# Patient Record
Sex: FEMALE | Race: WHITE
[De-identification: names, ages, dates, MRNs, and addresses within clinical notes are randomized per-mention and may not be internally consistent; named-entity substitution may affect disease eponyms.]

---

## 2020-12-09 ENCOUNTER — DASHBOARD ENCOUNTERS (OUTPATIENT)
Age: 55
End: 2020-12-09

## 2020-12-09 ENCOUNTER — OFFICE VISIT (OUTPATIENT)
Dept: URBAN - METROPOLITAN AREA CLINIC 78 | Facility: CLINIC | Age: 55
End: 2020-12-09
Payer: COMMERCIAL

## 2020-12-09 DIAGNOSIS — K57.50 DIVERTICULOSIS OF BOTH SMALL AND LARGE INTESTINE: ICD-10-CM

## 2020-12-09 DIAGNOSIS — Z90.710 HISTORY OF ABDOMINAL HYSTERECTOMY: ICD-10-CM

## 2020-12-09 DIAGNOSIS — R10.32 LEFT LOWER QUADRANT PAIN: ICD-10-CM

## 2020-12-09 DIAGNOSIS — Z87.19 HISTORY OF ABDOMINAL HERNIA: ICD-10-CM

## 2020-12-09 PROBLEM — 236886002 HYSTERECTOMY: Status: ACTIVE | Noted: 2020-12-09

## 2020-12-09 PROBLEM — 473173007 HISTORY OF ABDOMINAL HYSTERECTOMY: Status: ACTIVE | Noted: 2020-12-09

## 2020-12-09 PROBLEM — 398050005 DIVERTICULAR DISEASE OF COLON: Status: ACTIVE | Noted: 2020-12-09

## 2020-12-09 PROCEDURE — 99244 OFF/OP CNSLTJ NEW/EST MOD 40: CPT | Performed by: INTERNAL MEDICINE

## 2020-12-09 PROCEDURE — G8482 FLU IMMUNIZE ORDER/ADMIN: HCPCS | Performed by: INTERNAL MEDICINE

## 2020-12-09 RX ORDER — SODIUM, POTASSIUM,MAG SULFATES 17.5-3.13G
177 ML DAY 1 AND 177 ML DAY 2 SOLUTION, RECONSTITUTED, ORAL ORAL
Qty: 354 MILLILITER | Refills: 0 | OUTPATIENT
Start: 2020-12-09

## 2020-12-09 NOTE — PHYSICAL EXAM GASTROINTESTINAL
Abdomen , soft, non-tender, non-distended , no guarding or rigidity , no masses palpable , normal bowel sounds central obesity  Liver and Spleen , no hepatomegaly present , liver non-tender , spleen not palpable

## 2020-12-09 NOTE — HPI-TODAY'S VISIT:
Patient was referred by Delia Ignacio NP for abdominal pain A copy of this document will be sent to the physician.  Patient is s/p Nissens fundoplication s/p sigmoid resection s/p cholecystectomy  s/p appendectomy   Pt is s/p left lower quadrant pain s/p ER visit s/p CT scan .. sigmoid diverticulois .Pain is better ( took levsin one ) Stable heaptic cyst  Labs CMP is nromal K 3.4  CBC is normal  Hypodense lesion involging the upper pole of the left kidney ,not typical of simple cyst and potential solid mass    Pt reports a fall in recent past on left side  She has a little limp on left hip  Weight is stable over last one year

## 2021-02-05 ENCOUNTER — OFFICE VISIT (OUTPATIENT)
Dept: URBAN - METROPOLITAN AREA SURGERY CENTER 15 | Facility: SURGERY CENTER | Age: 56
End: 2021-02-05

## 2021-03-05 ENCOUNTER — TELEPHONE ENCOUNTER (OUTPATIENT)
Dept: URBAN - METROPOLITAN AREA CLINIC 78 | Facility: CLINIC | Age: 56
End: 2021-03-05

## 2021-03-05 RX ORDER — SODIUM, POTASSIUM,MAG SULFATES 17.5-3.13G
177 ML DAY 1 AND 177 ML DAY 2 SOLUTION, RECONSTITUTED, ORAL ORAL
Qty: 354 MILLILITER | Refills: 0 | Status: ACTIVE | COMMUNITY
Start: 2020-12-09

## 2021-03-05 RX ORDER — SODIUM PICOSULFATE, MAGNESIUM OXIDE, AND ANHYDROUS CITRIC ACID 10; 3.5; 12 MG/160ML; G/160ML; G/160ML
160 ML DAY #1 AND 160 ML DAY # 2 LIQUID ORAL ONCE A DAY
Qty: 320 MILLIMETER | Refills: 0 | OUTPATIENT
Start: 2021-03-05 | End: 2021-03-07